# Patient Record
Sex: MALE | Race: WHITE | NOT HISPANIC OR LATINO | Employment: FULL TIME | URBAN - METROPOLITAN AREA
[De-identification: names, ages, dates, MRNs, and addresses within clinical notes are randomized per-mention and may not be internally consistent; named-entity substitution may affect disease eponyms.]

---

## 2022-06-24 ENCOUNTER — HOSPITAL ENCOUNTER (EMERGENCY)
Facility: CLINIC | Age: 32
Discharge: HOME | End: 2022-06-24
Attending: EMERGENCY MEDICINE | Admitting: EMERGENCY MEDICINE
Payer: COMMERCIAL

## 2022-06-24 VITALS
WEIGHT: 195 LBS | SYSTOLIC BLOOD PRESSURE: 162 MMHG | RESPIRATION RATE: 18 BRPM | DIASTOLIC BLOOD PRESSURE: 84 MMHG | TEMPERATURE: 98.4 F | HEART RATE: 57 BPM | OXYGEN SATURATION: 99 %

## 2022-06-24 DIAGNOSIS — S61.209A AVULSION OF FINGER, INITIAL ENCOUNTER: ICD-10-CM

## 2022-06-24 PROCEDURE — 272N000397 HC DRESSING QUICK CLOT 4X4

## 2022-06-24 PROCEDURE — 99283 EMERGENCY DEPT VISIT LOW MDM: CPT

## 2022-06-24 ASSESSMENT — ENCOUNTER SYMPTOMS: WOUND: 1

## 2022-06-24 NOTE — ED PROVIDER NOTES
History   Chief Complaint:  Laceration     The history is provided by the patient.      Ayad Swain is a 31 year old male, right-handed, who presents with a laceration to the distal aspect of his left index finger, sustained while cutting drywall earlier this morning. The patient reports cutting his finger with a knife and notes persistent bleeding since, ultimately resulting in his presentation to the ED. He describes the pain as throbbing with a severity of approximately 2/10. He reports being up to date on his tetanus shot (to his knowledge) but is unsure of the exact date of this. He notes history of low platelet count with levels around 150-180 and is unsure of etiology of this. He is not anticoagulated. He does drink alcohol occasionally.    Review of Systems   Skin: Positive for wound (lac to left index finger).   All other systems reviewed and are negative.    Allergies:  No known drug allergies    Medications:  The patient takes no daily medication.    Past Medical History:     History reviewed. No pertinent past medical history on file.    Past Surgical History:    Adenoidectomy  Left shoulder surgery    Family History:  Hypertension    Social History:  The patient presents to the ED alone.  The patient arrived in a private vehicle.  PCP: [unfilled]     Physical Exam     Patient Vitals for the past 24 hrs:   BP Temp Temp src Pulse Resp SpO2 Weight   06/24/22 1250 (!) 162/84 -- -- 57 -- -- --   06/24/22 1248 -- 98.4  F (36.9  C) Temporal -- 18 99 % 88.5 kg (195 lb)     Physical Exam  VS: Reviewed per above  HENT: normal speech  EYES: sclera anicteric  CV: Rate as noted,  regular rhythm.   RESP: Effort normal.   NEURO: Alert, moving all extremities  MSK: No deformity of the extremities  SKIN: Warm and dry, soft tissue defect on the radial aspect of the left distal finger pad with avulsion of part of the nail bed/nail as well.  Defect seems to spare the germinal matrix.  Wound is oozing blood  when dressing is removed.      Emergency Department Course     Emergency Department Course:     Reviewed:  I reviewed nursing notes, vitals, past medical history and Care Everywhere.    Assessments:  1302 I obtained history and examined the patient as noted above.   1351 I rechecked the patient. I believe that they are safe for discharge at this time.     Disposition:  The patient was discharged to home.     Impression & Plan     Medical Decision Making:  Patient presents to the ER for evaluation of distal left index finger pad soft tissue avulsion and bleeding after cutting finger on razor blade while doing drywall work.  On arrival vital signs are reassuring.  Upon examination of wound, it is oozing persistently.  Patient believes his tetanus is up-to-date and declines repeat vaccination today.  Surgifoam applied to the wound and covered with Coban.  After monitoring, dressing was removed and there was no active bleeding until I removed the Surgifoam.  Wound was irrigated copiously and Surgifoam was replaced and Coban was loosely applied.  Plan for patient to remove this dressing in 24 hours time and cover the wound with bacitracin and bandages going forward.  Discussed return to ER precautions.  Information for orthopedic hand surgery follow-up provided if any concerns develop regarding progression of healing.    Diagnosis:    ICD-10-CM    1. Avulsion of finger, initial encounter  S61.209A      Scribe Disclosure:  I, Karma Arauz, am serving as a scribe at 12:59 PM on 6/24/2022 to document services personally performed by Anatoly Duran MD based on my observations and the provider's statements to me.     IInge, am serving as a scribe at 1259 on 6/24/2022 to document services personally performed by Anatoly Duran MD based on my observations and the provider's statements to me.        Anatoly Duran MD  06/24/22 7234

## 2022-06-24 NOTE — DISCHARGE INSTRUCTIONS
Please remove the dressing in 24 hours.  Soak the dressing in water to allow for easier removal of the gauze.  You can cover the wound with antibiotic ointment and Band-Aids.  Keep the wound covered as it heals.  Return for increasing redness/warmth as this could be sign of infection.  Also return for recurrent bleeding that does not stop.  You can follow-up with the orthopedic hand surgery team if you have any concerns about healing progression.  Otherwise you can expect this will heal over the course of a few weeks.

## 2022-06-24 NOTE — ED TRIAGE NOTES
A&O x4, ABCs intact. Pt presents with cut to index finger on left hand. Pt states that he was cutting drywall at home and cut his finger.        Triage Assessment     Row Name 06/24/22 1249       Triage Assessment (Adult)    Airway WDL WDL       Respiratory WDL    Respiratory WDL WDL       Cardiac WDL    Cardiac WDL WDL